# Patient Record
Sex: MALE | Race: WHITE | Employment: FULL TIME | URBAN - NONMETROPOLITAN AREA
[De-identification: names, ages, dates, MRNs, and addresses within clinical notes are randomized per-mention and may not be internally consistent; named-entity substitution may affect disease eponyms.]

---

## 2017-01-01 ENCOUNTER — HOSPITAL ENCOUNTER (EMERGENCY)
Age: 37
End: 2017-10-14
Attending: EMERGENCY MEDICINE
Payer: COMMERCIAL

## 2017-01-01 VITALS — WEIGHT: 185 LBS | HEIGHT: 72 IN | BODY MASS INDEX: 25.06 KG/M2

## 2017-01-01 DIAGNOSIS — I46.9 CARDIOPULMONARY ARREST (HCC): Primary | ICD-10-CM

## 2017-01-01 LAB — GLUCOSE BLD-MCNC: < 25 MG/DL (ref 70–108)

## 2017-01-01 PROCEDURE — 96375 TX/PRO/DX INJ NEW DRUG ADDON: CPT

## 2017-01-01 PROCEDURE — 96374 THER/PROPH/DIAG INJ IV PUSH: CPT

## 2017-01-01 PROCEDURE — 6360000002 HC RX W HCPCS: Performed by: EMERGENCY MEDICINE

## 2017-01-01 PROCEDURE — 99285 EMERGENCY DEPT VISIT HI MDM: CPT

## 2017-01-01 PROCEDURE — 92950 HEART/LUNG RESUSCITATION CPR: CPT

## 2017-01-01 PROCEDURE — 2500000003 HC RX 250 WO HCPCS: Performed by: EMERGENCY MEDICINE

## 2017-01-01 PROCEDURE — 2580000003 HC RX 258: Performed by: EMERGENCY MEDICINE

## 2017-01-01 PROCEDURE — 82948 REAGENT STRIP/BLOOD GLUCOSE: CPT

## 2017-01-01 RX ORDER — DEXTROSE MONOHYDRATE 25 G/50ML
INJECTION, SOLUTION INTRAVENOUS DAILY PRN
Status: COMPLETED | OUTPATIENT
Start: 2017-01-01 | End: 2017-01-01

## 2017-01-01 RX ORDER — 0.9 % SODIUM CHLORIDE 0.9 %
1000 INTRAVENOUS SOLUTION INTRAVENOUS ONCE
Status: DISCONTINUED | OUTPATIENT
Start: 2017-01-01 | End: 2017-01-01 | Stop reason: HOSPADM

## 2017-01-01 RX ORDER — 0.9 % SODIUM CHLORIDE 0.9 %
1000 INTRAVENOUS SOLUTION INTRAVENOUS ONCE
Status: COMPLETED | OUTPATIENT
Start: 2017-01-01 | End: 2017-01-01

## 2017-01-01 RX ADMIN — EPINEPHRINE 1 MG: 0.1 INJECTION, SOLUTION ENDOTRACHEAL; INTRACARDIAC; INTRAVENOUS at 09:05

## 2017-01-01 RX ADMIN — DEXTROSE MONOHYDRATE 25 G: 25 INJECTION, SOLUTION INTRAVENOUS at 09:08

## 2017-01-01 RX ADMIN — EPINEPHRINE 1 MG: 0.1 INJECTION, SOLUTION ENDOTRACHEAL; INTRACARDIAC; INTRAVENOUS at 09:17

## 2017-01-01 RX ADMIN — EPINEPHRINE 1 MG: 0.1 INJECTION, SOLUTION ENDOTRACHEAL; INTRACARDIAC; INTRAVENOUS at 09:13

## 2017-01-01 RX ADMIN — EPINEPHRINE 1 MG: 0.1 INJECTION, SOLUTION ENDOTRACHEAL; INTRACARDIAC; INTRAVENOUS at 09:10

## 2017-01-01 RX ADMIN — Medication 50 MEQ: at 09:11

## 2017-01-01 RX ADMIN — SODIUM CHLORIDE 1000 ML: 9 INJECTION, SOLUTION INTRAVENOUS at 09:05

## 2017-01-01 RX ADMIN — EPINEPHRINE 1 MG: 0.1 INJECTION, SOLUTION ENDOTRACHEAL; INTRACARDIAC; INTRAVENOUS at 09:20

## 2017-10-14 NOTE — FLOWSHEET NOTE
10/14/17 0946   Encounter Summary   Services provided to: Patient and family together   Length of Encounter 45 minutes   Spiritual Assessment Completed Yes   Crisis   Type Code   Assessment Grieving   Intervention Prayer   Outcome Tearful   Spiritual/Orthodox   Type Spiritual support   Assessment Unable to respond   Intervention Prayer   Outcome Grieving   Grief and Life Adjustment   Type Death     Subjective:  Patient was brought in as a trauma patient. He coded and  as medical staff were providing care. Patient was receiving care through various medical tools. There were several staff in the room caring for patientt. Objective:  Patient wife stated that the whole event seemed like a dream because she could not believe that this was happening to her . Patient also stated that the have three children she did not know what to tell the children about their dad. I provided words of comfort and hope to family as staff were tending to the patient. I also offered prayer for strength in time of death and sorrow. Assessment:   Patient also had his wife and mother in law in the room both of whom were very tearful. I asked family if they were expecting other family members to arrive. They said no. Plan: It will be helpful for spiritual Care to continue with prayer for family as they grief for the death of their loved one. Secondly, spiritual Care department will sent a special sympathy card to family at home in few weeks also inviting them to the [facility] service that will be held here at [address]. Our sympathy is for the family and will continue to pray for them as the go through their grief.

## 2017-10-14 NOTE — ED PROVIDER NOTES
[facility]  eMERGENCY dEPARTMENT eNCOUnter          CHIEF COMPLAINT       Chief Complaint   Patient presents with    Cardiac Arrest       Nurses Notes reviewed and I agree except as noted in the HPI. HISTORY OF PRESENT ILLNESS    Sade Watson is a 29 y.o. male. This patient presents to the ED via EMS for evaluation of unresponsiveness. The patient's wife last saw him healthy at 4 oclock this morning. When arriving back to their house at [address] [place] [address], she noticed her  had not left for work. She then found him face down on the floor, and bleeding from his nose and mouth. The wife then called EMS and started chest compressions. His wife states he has no major past medical history, and otherwise was normal in health. EMS states they inserted an ET tube and blood came up through the tube. They state insertion was easy and normal. En route they gave him 2 mg of Narcan, and the patient had no response. They also gave him an IO line left leg, epinephrine, and strapped a Lifeband chest compressions on the patient. EMS did not defibrillate the patient due to lack of shockable rhythm. EMS state he was cold to touch, cyanotic and in asystole. REVIEW OF SYSTEMS       Review of systems is not obtainable due to code    [address]    has a past medical history of Heart murmur and Pulmonary stenosis. SURGICAL HISTORY      has a past surgical history that includes Cardiac surgery; Cardiac valuve replacement; Cardiac valuve replacement; Refractive surgery; and Cardiac valuve replacement. CURRENT MEDICATIONS       Previous Medications    No medications on file       ALLERGIES     has No Known Allergies. FAMILY HISTORY     indicated that the status of his maternal grandfather is unknown. He indicated that the status of his paternal grandfather is unknown.    family history includes Cancer in his maternal grandfather; High Cholesterol in his paternal grandfather.     SOCIAL HISTORY